# Patient Record
Sex: MALE | Race: WHITE | NOT HISPANIC OR LATINO | URBAN - METROPOLITAN AREA
[De-identification: names, ages, dates, MRNs, and addresses within clinical notes are randomized per-mention and may not be internally consistent; named-entity substitution may affect disease eponyms.]

---

## 2022-02-15 ENCOUNTER — APPOINTMENT (RX ONLY)
Dept: URBAN - METROPOLITAN AREA CLINIC 329 | Facility: CLINIC | Age: 42
Setting detail: DERMATOLOGY
End: 2022-02-15

## 2022-02-15 DIAGNOSIS — L81.4 OTHER MELANIN HYPERPIGMENTATION: ICD-10-CM

## 2022-02-15 DIAGNOSIS — L57.8 OTHER SKIN CHANGES DUE TO CHRONIC EXPOSURE TO NONIONIZING RADIATION: ICD-10-CM

## 2022-02-15 DIAGNOSIS — D485 NEOPLASM OF UNCERTAIN BEHAVIOR OF SKIN: ICD-10-CM

## 2022-02-15 DIAGNOSIS — D22 MELANOCYTIC NEVI: ICD-10-CM

## 2022-02-15 PROBLEM — D22.5 MELANOCYTIC NEVI OF TRUNK: Status: ACTIVE | Noted: 2022-02-15

## 2022-02-15 PROBLEM — D48.5 NEOPLASM OF UNCERTAIN BEHAVIOR OF SKIN: Status: ACTIVE | Noted: 2022-02-15

## 2022-02-15 PROCEDURE — ? BIOPSY BY SHAVE METHOD

## 2022-02-15 PROCEDURE — ? BODY PHOTOGRAPHY

## 2022-02-15 PROCEDURE — 99203 OFFICE O/P NEW LOW 30 MIN: CPT | Mod: 25

## 2022-02-15 PROCEDURE — ? COUNSELING

## 2022-02-15 PROCEDURE — 11102 TANGNTL BX SKIN SINGLE LES: CPT

## 2022-02-15 PROCEDURE — ? TREATMENT REGIMEN

## 2022-02-15 ASSESSMENT — LOCATION DETAILED DESCRIPTION DERM
LOCATION DETAILED: LEFT LATERAL SUPERIOR CHEST
LOCATION DETAILED: RIGHT SUPERIOR LATERAL MIDBACK
LOCATION DETAILED: RIGHT SUPERIOR LATERAL MIDBACK
LOCATION DETAILED: STERNUM
LOCATION DETAILED: LEFT SUPERIOR MEDIAL UPPER BACK
LOCATION DETAILED: RIGHT SUPERIOR UPPER BACK
LOCATION DETAILED: RIGHT SUPERIOR UPPER BACK
LOCATION DETAILED: STERNUM
LOCATION DETAILED: LEFT LATERAL SUPERIOR CHEST

## 2022-02-15 ASSESSMENT — LOCATION SIMPLE DESCRIPTION DERM
LOCATION SIMPLE: CHEST
LOCATION SIMPLE: RIGHT LOWER BACK
LOCATION SIMPLE: RIGHT UPPER BACK
LOCATION SIMPLE: RIGHT UPPER BACK
LOCATION SIMPLE: CHEST
LOCATION SIMPLE: LEFT UPPER BACK
LOCATION SIMPLE: RIGHT LOWER BACK

## 2022-02-15 ASSESSMENT — LOCATION ZONE DERM
LOCATION ZONE: TRUNK
LOCATION ZONE: TRUNK

## 2022-02-15 NOTE — PROCEDURE: BODY PHOTOGRAPHY
Number Of Photographs (Optional- Will Not Render If 0): 36
Was The Entire Body Photographed (Cannot Bill Unless Entire Body Photographed)?: Please Select Yes or No - If you select Yes you are confirming that you took full body photographs
Consent was obtained for whole body photography. Patient understands that photograph costs may not be covered by insurance.
Reason For Photography: The patient is obtaining whole body photography to observe existing suspicious moles and or monitor for the appearance of any new lesions.
Detail Level: Detailed
Whole Body Statement: The whole body was photographed today.

## 2022-02-15 NOTE — PROCEDURE: TREATMENT REGIMEN
Otc Regimen: SPF daily
Plan: Pt strongly advised against tanning beds\\nIncrease risks of melanoma and premature aging were reviewed with pt
Detail Level: Zone

## 2022-04-08 ENCOUNTER — HOSPITAL ENCOUNTER (OUTPATIENT)
Dept: PHYSICAL THERAPY | Age: 42
Discharge: HOME | End: 2022-04-08
Payer: COMMERCIAL

## 2022-04-08 PROCEDURE — 97140 MANUAL THERAPY 1/> REGIONS: CPT

## 2022-04-08 PROCEDURE — 97161 PT EVAL LOW COMPLEX 20 MIN: CPT

## 2022-04-08 NOTE — PROGRESS NOTES
Krista Mendoza  : 1980  Primary: Cash Ball Of Joan Ramesh*  Secondary:  Therapy Center at [address] [place] [address]  [phone]   [phone]        OUTPATIENT PHYSICAL THERAPY: Daily Treatment Note 2022  Visit Count:  1    ICD-10: Treatment Diagnosis: Bicipital tendonitis, M75.21  Pain in limb, unspecified, right arm, M79.601  Precautions/Allergies:   Patient has no allergy information on record. TREATMENT PLAN:  Effective Dates: 2022 TO 2022 (30 days). Frequency/Duration: 1 time a week for 30 Day(s) MEDICAL/REFERRING DIAGNOSIS:  Biceps strain, initial encounter [Z41.651X]   DATE OF ONSET:  A few years  REFERRING PHYSICIAN: Referred, Self, MD  MD Orders: none  Return MD Appointment: none       Pre-treatment Symptoms/Complaints:  Patient reports ongoing arm pain during lifting for a while now  Pain: Initial: Pain Intensity 1: 3 10 Post Session:  1/10   Medications Last Reviewed:  2022  Updated Objective Findings:  See evaluation note from today  TREATMENT:     MANUAL THERAPY: (15 minutes): Joint mobilization and Soft tissue mobilization was utilized and necessary because of the patient's restricted joint motion and restricted motion of soft tissue.   -Instrument assisted soft tissue mobilization with consent signed 22 to right biceps, brachialis and wrist extensors )5 min not charged for  Dry Needles Used: 5   Dry Needles Removed: 5     -Soft tissue mobilization to biceps, brachials, brachioradialis, wrist extensors  -Mobilization to elbow and radius for on axis position. [facility]  Treatment/Session Summary:    · Response to Treatment:  Patient understands HEP and POC. He reports feeling good from the treatment today. · Communication/Consultation:  None today  · Equipment provided today:  None today  · Recommendations/Intent for next treatment session: Next visit will focus on elbow function.     Total Treatment Billable Duration:  15 minutes  PT Patient Time In/Time Out  Time In: 1500  Time Out: [address], PT    Future Appointments   Date Time Provider Aminata Hidalgo   4/14/2022  3:00 PM Juan Godwin, PT [facility]   4/21/2022  2:00 PM Juan Godwin, PT [facility]   4/27/2022  3:00 PM Juan Godwin, PT [facility]

## 2022-04-08 NOTE — THERAPY EVALUATION
Krista Tai  : 1980  Primary: Keyana Hernadez Of Joan Ramesh*  Secondary:  Therapy Center at [address] [place] [address]  [phone]   [phone]          OUTPATIENT PHYSICAL THERAPY:Initial Assessment 2022   ICD-10: Treatment Diagnosis: Bicipital tendonitis, M75.21  Pain in limb, unspecified, right arm, M79.601  Precautions/Allergies:   Patient has no allergy information on record. TREATMENT PLAN:  Effective Dates: 2022 TO 2022 (30 days). Frequency/Duration: 1 time a week for 30 Day(s) MEDICAL/REFERRING DIAGNOSIS:  Biceps strain, initial encounter [N42.960L]   DATE OF ONSET:  A few years  REFERRING PHYSICIAN: Referred, Self, MD  MD Orders: none  Return MD Appointment: none     INITIAL ASSESSMENT:  Mr. Jere Meehan presents with right elbow pain related to brachialis or brachioradialis strain during weight lifting. He shows restrictions in this area and some possible pronator teres tightness causing some neural compression. He is a good candidate for skilled PT in order to address listed impairments affecting his function. Nida Landin, PT, DPT, OCS, CFMT      PROBLEM LIST (Impacting functional limitations):  1. Decreased Strength  2. Increased Pain  3. Decreased Activity Tolerance  4. Decreased Flexibility/Joint Mobility INTERVENTIONS PLANNED: (Treatment may consist of any combination of the following)  1. Heat  2. Manual Therapy  3. Neuromuscular Re-education/Strengthening  4. Range of Motion (ROM)  5. Therapeutic Activites  6. Therapeutic Exercise/Strengthening     GOALS: (Goals have been discussed and agreed upon with patient.)  Short-Term Functional Goals: Time Frame: 2 weeks  1. Patient will report a greater than 25% improvement in overall pain during lifting in order to return to activity  2. Patient will be independent in [facility] for elbow joint mobility training  Discharge Goals: Time Frame: 4 weeks  1.  Patient will report a greater than 50% improvement in overall symptoms in order to show an increase in function  2. Patient will show a greater than 5 point decrease on the DASH in order to show an increase in function  3. Patient will perform his usual sets of biceps curls without pain increase  4. Patient will be independent in all HEP for his R UE    OUTCOME MEASURE:   Tool Used: Disabilities of the Arm, Shoulder and Hand (DASH) Questionnaire - Quick Version  Score:  Initial: 21/55  Most Recent: X/55 (Date: -- )   Interpretation of Score: The DASH is designed to measure the activities of daily living in person's with upper extremity dysfunction or pain. Each section is scored on a 1-5 scale, 5 representing the greatest disability. The scores of each section are added together for a total score of 55. MEDICAL NECESSITY:   · Patient is expected to demonstrate progress in strength, range of motion and functional technique to improve pain free function. · Patient demonstrates excellent rehab potential due to higher previous functional level. · Skilled intervention continues to be required due to increased biceps pain. REASON FOR SERVICES/OTHER COMMENTS:  · Patient continues to require skilled intervention due to continued pain with lifting. Total Duration:  PT Patient Time In/Time Out  Time In: 1500  Time Out: 1555    Rehabilitation Potential For Stated Goals: Excellent  Regarding Johnson Bustamante's therapy, I certify that the treatment plan above will be carried out by a therapist or under their direction. Thank you for this referral,  Humera Gu, PT     Referring Physician Signature: Referred, Self, MD No Signature is Required for this note. PAIN/SUBJECTIVE:   Initial: Pain Intensity 1: 3 /10 Post Session:  2/10   HISTORY:   History of Injury/Illness (Reason for Referral):  Patient reports that he previously had a tear in his biceps a few years ago.  He did not need any type of surgery for it, but everything seemed to be ok after a while. He then reports continuing with his lifting daily and felt a pop in his arm when doing a dumbbell press. He reports the pain is more toward the elbow at first on the outside more and has progressed to be more on the inside. He reports being still able to lift, but not as much with the R UE due to the pain being deep and dull right at that spot. He reports it has a burning sensation as well. His goal is to decreased his pain and return to full lifting. Past Medical History/Comorbidities:   Mr. Norm Linn  has no past medical history on file. Mr. Norm Linn  has no past surgical history on file. Social History/Living Environment:       Social History     Socioeconomic History    Marital status: UNKNOWN     Spouse name: Not on file    Number of children: Not on file    Years of education: Not on file    Highest education level: Not on file   Occupational History    Not on file   Tobacco Use    Smoking status: Not on file    Smokeless tobacco: Not on file   Substance and Sexual Activity    Alcohol use: Not on file    Drug use: Not on file    Sexual activity: Not on file   Other Topics Concern    Not on file   Social History Narrative    Not on file     Social Determinants of Health     Financial Resource Strain:     Difficulty of Paying Living Expenses: Not on file   Food Insecurity:     Worried About Running Out of Food in the Last Year: Not on file    Suze of Food in the Last Year: Not on file   Transportation Needs:     Lack of Transportation (Medical): Not on file    Lack of Transportation (Non-Medical):  Not on file   Physical Activity:     Days of Exercise per Week: Not on file    Minutes of Exercise per Session: Not on file   Stress:     Feeling of Stress : Not on file   Social Connections:     Frequency of Communication with Friends and Family: Not on file    Frequency of Social Gatherings with Friends and Family: Not on file    Attends Sikhism Services: Not on file   [place] Active Member of Clubs or Organizations: Not on file    Attends Club or Organization Meetings: Not on file    Marital Status: Not on file   Intimate Partner Violence:     Fear of Current or Ex-Partner: Not on file    Emotionally Abused: Not on file    Physically Abused: Not on file    Sexually Abused: Not on file   Housing Stability:     Unable to Pay for Housing in the Last Year: Not on file    Number of [place] in the Last Year: Not on file    Unstable Housing in the Last Year: Not on file       Prior Level of Function/Work/Activity:  Independent, works with robot software  Dominant Side:         RIGHT   Ambulatory/Rehab Services H2 Model Falls Risk Assessment   Risk Factors:       (1)  Gender [Male] Ability to Rise from Chair:       (0)  Ability to rise in a single movement   Falls Prevention Plan:       No modifications necessary   Total: (5 or greater = High Risk): 1   ©2010 [employer] [address] [place] [address] States Patent #[id number]. Federal Law prohibits the replication, distribution or use without written permission from [employer]   Current Medications:     No current outpatient medications on file. Date Last Reviewed:  4/10/2022     Number of Personal Factors/Comorbidities that affect the Plan of Care: 1-2: MODERATE COMPLEXITY   EXAMINATION:   Observation/Orthostatic Postural Assessment:          Patient points to right medial distal biceps near elbow for his pain point.  He shows no atrophy at this time in the R UE  Patient has some pain with full elbow flexion  Palpation:          Tightness in biceps, brachioradialis and brachialis muscles  Restrictions felt a little at olecranon process and fossa  Some lateral elbow tightness in extensor tendons  Restrictions at pronator teres  Pain felt with more pronated resisted elbow flexion and some with supination and prontation  ROM:          Full elbow motion shown  Strength:          4+/5 for elbow flexion with pronation and some pain with thumb up position   Body Structures Involved:  1. Bones  2. Joints  3. Muscles  4. Ligaments Body Functions Affected:  1. Neuromusculoskeletal  2. Movement Related Activities and Participation Affected:  1. General Tasks and Demands  2. Mobility  3.  Self Care   Number of elements (examined above) that affect the Plan of Care: 3: MODERATE COMPLEXITY   CLINICAL PRESENTATION:   Presentation: Stable and uncomplicated: LOW COMPLEXITY   CLINICAL DECISION MAKING:   Use of outcome tool(s) and clinical judgement create a POC that gives a: Clear prediction of patient's progress: LOW COMPLEXITY

## 2022-04-14 ENCOUNTER — HOSPITAL ENCOUNTER (OUTPATIENT)
Dept: PHYSICAL THERAPY | Age: 42
Discharge: HOME | End: 2022-04-14
Payer: COMMERCIAL

## 2022-04-14 PROCEDURE — 97140 MANUAL THERAPY 1/> REGIONS: CPT

## 2022-04-14 NOTE — PROGRESS NOTES
Tilford Pain  : 1980  Primary: Umesh Mendoza Of Ute Domitila*  Secondary:  [facility]rapy Center at [facility]  [address] [place] [address]  [phone]   [phone]        OUTPATIENT PHYSICAL THERAPY: Daily Treatment Note 2022  Visit Count:  2    ICD-10: Treatment Diagnosis: Bicipital tendonitis, M75.21  Pain in limb, unspecified, right arm, M79.601  Precautions/Allergies:   Patient has no allergy information on record. TREATMENT PLAN:  Effective Dates: 2022 TO 2022 (30 days). Frequency/Duration: 1 time a week for 30 Day(s) MEDICAL/REFERRING DIAGNOSIS:  Biceps strain, initial encounter [W85.673A]   DATE OF ONSET:  A few years  REFERRING PHYSICIAN: Referred, Self, MD SCANLON Orders: none  Return MD Appointment: none       Pre-treatment Symptoms/Complaints:  Patient reports that it felt good for a bit, but still comes back with working out  Pain: Initial: Pain Intensity 1: 2 /10 Post Session:  1/10   Medications Last Reviewed:  2022  Updated Objective Findings:  Continued soreness and pain at the medial biceps and lateral forearm  TREATMENT:     MANUAL THERAPY: (45 minutes): Joint mobilization and Soft tissue mobilization was utilized and necessary because of the patient's restricted joint motion and restricted motion of soft tissue.   -Instrument assisted soft tissue mobilization with consent signed 22 to right biceps, brachialis and wrist extensors )5 min not charged for  Dry Needles Used: 6   Dry Needles Removed: 6     -Soft tissue mobilization to biceps, brachials, brachioradialis, wrist extensors  -Mobilization to elbow and radius for on axis position.  -Pronator mobilization with supination  -Radial head mobilization anterior to posterior  -Functional training for elbow extension and supination    [facility] Portal  Treatment/Session Summary:    · Response to Treatment:  Patient shows good mobility, but still some pain with resistance.  Follow up next week  · Communication/Consultation:  None today  · Equipment provided today:  None today  · Recommendations/Intent for next treatment session: Next visit will focus on elbow function.     Total Treatment Billable Duration:  45 minutes  PT Patient Time In/Time Out  Time In: 1500  Time Out: [address], PT    Future Appointments   Date Time Provider Aminata Hidalgo   4/21/2022  2:00 PM Akash Hernandez, PT [facility]   4/27/2022  3:00 PM Akash Hernandez, PT [facility]

## 2022-04-21 ENCOUNTER — HOSPITAL ENCOUNTER (OUTPATIENT)
Dept: PHYSICAL THERAPY | Age: 42
Discharge: HOME | End: 2022-04-21
Payer: COMMERCIAL

## 2022-04-21 PROCEDURE — 97140 MANUAL THERAPY 1/> REGIONS: CPT

## 2022-04-21 NOTE — PROGRESS NOTES
[facility]  : 1980  Primary: Jennifer Ramesh Of Joan Ramesh*  Secondary:  Therapy Center at [address] [place] [address]  [phone]   [phone]        OUTPATIENT PHYSICAL THERAPY: Daily Treatment Note 2022  Visit Count:  3    ICD-10: Treatment Diagnosis: Bicipital tendonitis, M75.21  Pain in limb, unspecified, right arm, M79.601  Precautions/Allergies:   Patient has no allergy information on record. TREATMENT PLAN:  Effective Dates: 2022 TO 2022 (30 days). Frequency/Duration: 1 time a week for 30 Day(s) MEDICAL/REFERRING DIAGNOSIS:  Biceps strain, initial encounter [G61.657C]   DATE OF ONSET:  A few years  REFERRING PHYSICIAN: Referred, Self, MD  MD Orders: none  Return MD Appointment: none       Pre-treatment Symptoms/Complaints:  Patient reports that it has felt a little better since last time.  He did not push it as much in his workout this lat time  Pain: Initial: Pain Intensity 1: 10 Post Session:  1/10   Medications Last Reviewed:  2022  Updated Objective Findings:  Continued soreness and pain at the medial biceps and lateral forearm  TREATMENT:     MANUAL THERAPY: (45 minutes): Joint mobilization and Soft tissue mobilization was utilized and necessary because of the patient's restricted joint motion and restricted motion of soft tissue.   -Instrument assisted soft tissue mobilization with consent signed 22 to right biceps, brachialis and wrist extensors )5 min not charged for  Dry Needles Used: 7   Dry Needles Removed: 7     -Soft tissue mobilization to biceps, brachials, brachioradialis, wrist extensors  -Mobilization to elbow and radius for on axis position.  -Pronator mobilization with supination  -Radial head mobilization anterior to posterior  -Functional training for elbow extension and supination    [facility] Portal  Treatment/Session Summary:    · Response to Treatment:  Patient shows good mobility and decreased pain. Follow up next week and either discharge or get order  · Communication/Consultation:  None today  · Equipment provided today:  None today  · Recommendations/Intent for next treatment session: Next visit will focus on elbow function.     Total Treatment Billable Duration:  45 minutes  PT Patient Time In/Time Out  Time In: 1355  Time Out: [address], PT    Future Appointments   Date Time Provider Aminata Hidalgo   4/27/2022  3:00 PM Milly Mcelroy, PT [facility]

## 2022-04-27 ENCOUNTER — HOSPITAL ENCOUNTER (OUTPATIENT)
Dept: PHYSICAL THERAPY | Age: 42
Discharge: HOME | End: 2022-04-27
Payer: COMMERCIAL

## 2022-04-27 PROCEDURE — 97140 MANUAL THERAPY 1/> REGIONS: CPT

## 2022-04-27 NOTE — PROGRESS NOTES
Isaias Martinez  : 1980  Primary: Link Fernández Of Joan Ramesh*  Secondary:  Therapy Center at [facility]  [address] [place] [address]  [phone]   Critical access [phone]        OUTPATIENT PHYSICAL THERAPY: Daily Treatment Note 2022  Visit Count:  4    ICD-10: Treatment Diagnosis: Bicipital tendonitis, M75.21  Pain in limb, unspecified, right arm, M79.601  Precautions/Allergies:   Patient has no allergy information on record. TREATMENT PLAN:  Effective Dates: 2022 TO 2022 (30 days). Frequency/Duration: 1 time a week for 30 Day(s) MEDICAL/REFERRING DIAGNOSIS:  Biceps strain, initial encounter [O76.228T]   DATE OF ONSET:  A few years  REFERRING PHYSICIAN: Referred, Self, MD  MD Orders: none  Return MD Appointment: none       Pre-treatment Symptoms/Complaints:  Patient reports that it has felt a little better since last time.  He is still pushing it some, but it for the most part is getting better, just slowly  Pain: Initial: Pain Intensity 1: 1 /10 Post Session:  1/10   Medications Last Reviewed:  2022  Updated Objective Findings:  Continued soreness and pain at the medial biceps and lateral forearm  TREATMENT:     MANUAL THERAPY: (45 minutes): Joint mobilization and Soft tissue mobilization was utilized and necessary because of the patient's restricted joint motion and restricted motion of soft tissue.   -Instrument assisted soft tissue mobilization with consent signed 22 to right biceps, brachialis and wrist extensors )5 min not charged for  Dry Needles Used: 7   Dry Needles Removed: 7     -Soft tissue mobilization to biceps, brachials, brachioradialis, wrist extensors  -Mobilization to elbow and radius for on axis position.  -Pronator mobilization with supination  -Radial head mobilization anterior to posterior  -Functional training for elbow extension and supination    [facility] Portal  Treatment/Session Summary:    · Response to Treatment:  Patient shows good mobility and decreased pain. Patient to get order if he wants further treatment  · Communication/Consultation:  None today  · Equipment provided today:  None today  · Recommendations/Intent for next treatment session: Next visit will focus on elbow function. Total Treatment Billable Duration:  45 minutes  PT Patient Time In/Time Out  Time In: 9048  Time Out: [address], PT    No future appointments.